# Patient Record
Sex: FEMALE | Race: OTHER | NOT HISPANIC OR LATINO
[De-identification: names, ages, dates, MRNs, and addresses within clinical notes are randomized per-mention and may not be internally consistent; named-entity substitution may affect disease eponyms.]

---

## 2018-06-18 ENCOUNTER — APPOINTMENT (OUTPATIENT)
Dept: OPHTHALMOLOGY | Facility: CLINIC | Age: 68
End: 2018-06-18
Payer: MEDICARE

## 2018-06-18 PROBLEM — Z00.00 ENCOUNTER FOR PREVENTIVE HEALTH EXAMINATION: Status: ACTIVE | Noted: 2018-06-18

## 2018-06-18 PROCEDURE — 92014 COMPRE OPH EXAM EST PT 1/>: CPT

## 2018-09-24 ENCOUNTER — APPOINTMENT (OUTPATIENT)
Dept: OPHTHALMOLOGY | Facility: CLINIC | Age: 68
End: 2018-09-24
Payer: MEDICARE

## 2018-09-24 PROCEDURE — 92012 INTRM OPH EXAM EST PATIENT: CPT

## 2018-10-11 ENCOUNTER — APPOINTMENT (OUTPATIENT)
Dept: OPHTHALMOLOGY | Facility: CLINIC | Age: 68
End: 2018-10-11
Payer: MEDICARE

## 2018-10-11 DIAGNOSIS — H25.11 AGE-RELATED NUCLEAR CATARACT, RIGHT EYE: ICD-10-CM

## 2018-10-11 DIAGNOSIS — H43.812 VITREOUS DEGENERATION, LEFT EYE: ICD-10-CM

## 2018-10-11 DIAGNOSIS — H52.31 ANISOMETROPIA: ICD-10-CM

## 2018-10-11 PROCEDURE — 92014 COMPRE OPH EXAM EST PT 1/>: CPT

## 2019-06-17 ENCOUNTER — APPOINTMENT (OUTPATIENT)
Dept: OPHTHALMOLOGY | Facility: CLINIC | Age: 69
End: 2019-06-17

## 2019-08-16 ENCOUNTER — APPOINTMENT (OUTPATIENT)
Dept: OPHTHALMOLOGY | Facility: CLINIC | Age: 69
End: 2019-08-16
Payer: MEDICARE

## 2019-08-16 ENCOUNTER — NON-APPOINTMENT (OUTPATIENT)
Age: 69
End: 2019-08-16

## 2019-08-16 PROCEDURE — 92015 DETERMINE REFRACTIVE STATE: CPT

## 2020-08-21 ENCOUNTER — APPOINTMENT (OUTPATIENT)
Dept: OPHTHALMOLOGY | Facility: CLINIC | Age: 70
End: 2020-08-21

## 2021-04-29 ENCOUNTER — APPOINTMENT (OUTPATIENT)
Dept: OTOLARYNGOLOGY | Facility: CLINIC | Age: 71
End: 2021-04-29
Payer: MEDICARE

## 2021-04-29 ENCOUNTER — NON-APPOINTMENT (OUTPATIENT)
Age: 71
End: 2021-04-29

## 2021-04-29 VITALS
OXYGEN SATURATION: 97 % | DIASTOLIC BLOOD PRESSURE: 76 MMHG | RESPIRATION RATE: 12 BRPM | HEIGHT: 59 IN | BODY MASS INDEX: 21.07 KG/M2 | SYSTOLIC BLOOD PRESSURE: 123 MMHG | HEART RATE: 78 BPM | TEMPERATURE: 97.8 F | WEIGHT: 104.5 LBS

## 2021-04-29 DIAGNOSIS — H91.90 UNSPECIFIED HEARING LOSS, UNSPECIFIED EAR: ICD-10-CM

## 2021-04-29 DIAGNOSIS — Z82.3 FAMILY HISTORY OF STROKE: ICD-10-CM

## 2021-04-29 DIAGNOSIS — Z78.9 OTHER SPECIFIED HEALTH STATUS: ICD-10-CM

## 2021-04-29 DIAGNOSIS — Z80.9 FAMILY HISTORY OF MALIGNANT NEOPLASM, UNSPECIFIED: ICD-10-CM

## 2021-04-29 DIAGNOSIS — Z82.2 FAMILY HISTORY OF DEAFNESS AND HEARING LOSS: ICD-10-CM

## 2021-04-29 DIAGNOSIS — H69.81 OTHER SPECIFIED DISORDERS OF EUSTACHIAN TUBE, RIGHT EAR: ICD-10-CM

## 2021-04-29 DIAGNOSIS — H69.80 OTHER SPECIFIED DISORDERS OF EUSTACHIAN TUBE, UNSPECIFIED EAR: ICD-10-CM

## 2021-04-29 DIAGNOSIS — Z87.11 PERSONAL HISTORY OF PEPTIC ULCER DISEASE: ICD-10-CM

## 2021-04-29 DIAGNOSIS — Z87.39 PERSONAL HISTORY OF OTHER DISEASES OF THE MUSCULOSKELETAL SYSTEM AND CONNECTIVE TISSUE: ICD-10-CM

## 2021-04-29 DIAGNOSIS — H93.8X9 OTHER SPECIFIED DISORDERS OF EAR, UNSPECIFIED EAR: ICD-10-CM

## 2021-04-29 DIAGNOSIS — Z72.89 OTHER PROBLEMS RELATED TO LIFESTYLE: ICD-10-CM

## 2021-04-29 PROCEDURE — 99203 OFFICE O/P NEW LOW 30 MIN: CPT | Mod: 25

## 2021-04-29 PROCEDURE — 92550 TYMPANOMETRY & REFLEX THRESH: CPT

## 2021-04-29 PROCEDURE — 31231 NASAL ENDOSCOPY DX: CPT

## 2021-04-29 PROCEDURE — 92557 COMPREHENSIVE HEARING TEST: CPT

## 2021-04-29 RX ORDER — VALACYCLOVIR HYDROCHLORIDE 1 G/1
TABLET, FILM COATED ORAL
Refills: 0 | Status: ACTIVE | COMMUNITY

## 2021-04-29 NOTE — HISTORY OF PRESENT ILLNESS
[de-identified] : 70F who presents with chronic R ear fullness. Patient reports she has had R ear fullness, not resolved with autoinsufflation, for many years after flying with a cold. She admits to know hearing loss for which she uses hearing aids of which the left one she lost today. She denies any hearing changes, tinnitus, otalgia, otorrhea, vertigo. No other ENT complaints. No pertinent FH/SH. She wishes to get a new hearing aid.

## 2021-04-29 NOTE — PROCEDURE
[Posterior Lesion] : posterior lesion [Anterior rhinoscopy insufficient to account for symptoms] : anterior rhinoscopy insufficient to account for symptoms [Flexible Endoscope] : examined with the flexible endoscope [Serial Number: ___] : Serial Number: [unfilled] [Normal] : the paranasal sinuses had no abnormalities [FreeTextEntry6] : Procedure explained to patient with all risks, benefits and alternatives, all questions answered. Patient positioned sitting upright. Flexible endoscope was used to examine both nasal passages. Right nasal passage with normal inferior and middle turbinates. Left nasal passage with normal inferior and middle turbinates. Nasal septum straight without deviation or perforation. OMU with open outflow tract. No masses, lesions, polyps or purulent drainage. Eustachian opening without masses or swelling. Nasopharynx without obstruction or stenosis. NO obstruction of the eustachian ostium bilaterally. performed to r/o npx lesion causing r aural fullness which does not clear. she has adenoidectomy scar in npx.\par

## 2021-04-29 NOTE — ASSESSMENT
[FreeTextEntry1] : 70F who presents with chronic R ear fullness. Audiogram shows an overly compliant R ET. Explained in detail She requested endoscopy because her last ENT used it and she was concerned about et clearing- normal, with adenoidectomy scar visible\par \par Plan:\par - HAE to replace missing hearing aid\par - f/u with mri if not already done

## 2021-05-13 ENCOUNTER — APPOINTMENT (OUTPATIENT)
Dept: PHARMACY | Facility: CLINIC | Age: 71
End: 2021-05-13
Payer: SELF-PAY

## 2021-05-13 PROCEDURE — V5010 ASSESSMENT FOR HEARING AID: CPT

## 2021-05-14 ENCOUNTER — TRANSCRIPTION ENCOUNTER (OUTPATIENT)
Age: 71
End: 2021-05-14

## 2021-05-28 ENCOUNTER — APPOINTMENT (OUTPATIENT)
Dept: PHARMACY | Facility: CLINIC | Age: 71
End: 2021-05-28
Payer: MEDICARE

## 2021-05-28 PROCEDURE — V5299A: CUSTOM | Mod: NC

## 2021-06-14 ENCOUNTER — APPOINTMENT (OUTPATIENT)
Dept: PHARMACY | Facility: CLINIC | Age: 71
End: 2021-06-14
Payer: SELF-PAY

## 2021-06-14 PROCEDURE — V5011: CPT

## 2021-06-14 PROCEDURE — V5261E: CUSTOM

## 2021-06-29 ENCOUNTER — APPOINTMENT (OUTPATIENT)
Dept: PHARMACY | Facility: CLINIC | Age: 71
End: 2021-06-29
Payer: MEDICARE

## 2021-06-29 PROCEDURE — V5299A: CUSTOM | Mod: NC

## 2022-02-25 ENCOUNTER — NON-APPOINTMENT (OUTPATIENT)
Age: 72
End: 2022-02-25

## 2022-03-02 ENCOUNTER — APPOINTMENT (OUTPATIENT)
Dept: OTOLARYNGOLOGY | Facility: CLINIC | Age: 72
End: 2022-03-02
Payer: SELF-PAY

## 2022-03-02 PROCEDURE — V5299A: CUSTOM | Mod: NC

## 2022-07-11 ENCOUNTER — APPOINTMENT (OUTPATIENT)
Dept: PHARMACY | Facility: CLINIC | Age: 72
End: 2022-07-11

## 2022-07-11 ENCOUNTER — APPOINTMENT (OUTPATIENT)
Dept: OTOLARYNGOLOGY | Facility: CLINIC | Age: 72
End: 2022-07-11

## 2022-07-11 VITALS
OXYGEN SATURATION: 97 % | WEIGHT: 104 LBS | TEMPERATURE: 97 F | HEART RATE: 88 BPM | SYSTOLIC BLOOD PRESSURE: 142 MMHG | DIASTOLIC BLOOD PRESSURE: 89 MMHG | RESPIRATION RATE: 16 BRPM | BODY MASS INDEX: 20.96 KG/M2 | HEIGHT: 59 IN

## 2022-07-11 DIAGNOSIS — H90.3 SENSORINEURAL HEARING LOSS, BILATERAL: ICD-10-CM

## 2022-07-11 DIAGNOSIS — H80.01: ICD-10-CM

## 2022-07-11 PROCEDURE — 92557 COMPREHENSIVE HEARING TEST: CPT

## 2022-07-11 PROCEDURE — 99213 OFFICE O/P EST LOW 20 MIN: CPT

## 2022-07-11 PROCEDURE — 92550 TYMPANOMETRY & REFLEX THRESH: CPT

## 2022-07-11 PROCEDURE — V5299A: CUSTOM | Mod: NC

## 2022-07-11 NOTE — HISTORY OF PRESENT ILLNESS
[de-identified] : 72 yo F with h/o b predominantly snhl wishes to check followup . Hearing has worsened over the past yr and she lost her wax guards. Denies pain or drainage. Father had undiagnosed hl at a young adult age.

## 2022-07-11 NOTE — ASSESSMENT
[FreeTextEntry1] : looks like r otosclerosis - b snhl\par discussed options- ha adjustment for now\par if worsens could consider stapes surgery\par explained diagrammatically\par brought to audiology for ha adjustment\par rtc 1 yr with kym

## 2022-07-18 ENCOUNTER — TRANSCRIPTION ENCOUNTER (OUTPATIENT)
Age: 72
End: 2022-07-18

## 2022-07-18 ENCOUNTER — NON-APPOINTMENT (OUTPATIENT)
Age: 72
End: 2022-07-18

## 2022-11-08 ENCOUNTER — NON-APPOINTMENT (OUTPATIENT)
Age: 72
End: 2022-11-08

## 2022-11-09 ENCOUNTER — APPOINTMENT (OUTPATIENT)
Dept: OTOLARYNGOLOGY | Facility: CLINIC | Age: 72
End: 2022-11-09

## 2022-11-09 VITALS
HEIGHT: 59 IN | HEART RATE: 74 BPM | OXYGEN SATURATION: 98 % | DIASTOLIC BLOOD PRESSURE: 72 MMHG | SYSTOLIC BLOOD PRESSURE: 114 MMHG | RESPIRATION RATE: 18 BRPM | BODY MASS INDEX: 21.17 KG/M2 | WEIGHT: 105 LBS | TEMPERATURE: 96.6 F

## 2022-11-09 DIAGNOSIS — H92.01 OTALGIA, RIGHT EAR: ICD-10-CM

## 2022-11-09 PROCEDURE — 99214 OFFICE O/P EST MOD 30 MIN: CPT

## 2022-11-09 NOTE — ASSESSMENT
[FreeTextEntry1] : 1. vertigo\par -\par -MRI\par -VNG\par -US Carotid Dopplers \par -meclizine discussed- pt prefers to hold off \par 2. r jaw pain likely d/t TMJ\par -Aleve BID x 10 days if pt can tolerate\par -Soft diet\par -Avoid bruxism and chewing gum\par -Followup with dentist for mouth guard - she said she has a dentist, and will call the office if they do not treat TMJ \par RTC with , MRI, VNG, US Carotid Dopplers to review findings \par

## 2022-11-09 NOTE — PHYSICAL EXAM
[Normal] : mucosa is normal [Midline] : trachea located in midline position [de-identified] : r>l [] : [place]-Hallpike test is negative [de-identified] : gait steady

## 2022-11-09 NOTE — HISTORY OF PRESENT ILLNESS
[de-identified] : 4 month followup visit for this 71 y/o F with h/o b snhl presenting with dizziness for the past 10 days. On 10/31 she woke up at night to  her puppy- leaned over and lost her balance - she had it 2 more nights - a little better now getting up slowly. Over the weekend she took a walk with her  and felt "off" also if filing with hands up felt dizzy- also exacerbated by yoga positions - she is thinking it is getting better but still persists. 89 yo  got covid 10/19 Dr Barber started her on paxlovid with him. She is c/o r jaw pain for the past two months. She saw her dentist who does not believe it is related to her teeth.

## 2022-11-11 ENCOUNTER — NON-APPOINTMENT (OUTPATIENT)
Age: 72
End: 2022-11-11

## 2022-12-22 ENCOUNTER — APPOINTMENT (OUTPATIENT)
Dept: OTOLARYNGOLOGY | Facility: CLINIC | Age: 72
End: 2022-12-22

## 2022-12-22 VITALS
BODY MASS INDEX: 21.17 KG/M2 | TEMPERATURE: 97.7 F | SYSTOLIC BLOOD PRESSURE: 146 MMHG | HEART RATE: 68 BPM | DIASTOLIC BLOOD PRESSURE: 83 MMHG | HEIGHT: 59 IN | WEIGHT: 105 LBS | OXYGEN SATURATION: 98 %

## 2022-12-22 DIAGNOSIS — I63.9 CEREBRAL INFARCTION, UNSPECIFIED: ICD-10-CM

## 2022-12-22 PROCEDURE — 92537 CALORIC VSTBLR TEST W/REC: CPT

## 2022-12-22 PROCEDURE — 99214 OFFICE O/P EST MOD 30 MIN: CPT

## 2022-12-22 PROCEDURE — 92540 BASIC VESTIBULAR EVALUATION: CPT

## 2022-12-22 PROCEDURE — 92550 TYMPANOMETRY & REFLEX THRESH: CPT | Mod: 52

## 2022-12-22 PROCEDURE — 92557 COMPREHENSIVE HEARING TEST: CPT

## 2022-12-22 NOTE — DATA REVIEWED
[de-identified] : VNG showed l unilateral vestibular weakness -results reviewed with pt \par  showed r mixed hl, l snhl stable when compared with 22 -results reviewed with pt  [de-identified] : MRI revealed r cerebellar infarct -results reviewed with pt  [de-identified] : US Carotids showed plaques in carotid arteries, no significant stenosis -results reviewed with pt

## 2022-12-22 NOTE — HISTORY OF PRESENT ILLNESS
[de-identified] : 6 week followup visit for this 71 y/o F with dizziness. She is here to review MRI, , VNG, and US Carotid Dopplers. She feels dizziness has improved, but not resolved completely. She had MRI which revealed right cerebellar stroke and saw Dr. Nielsen for this. He is treating her for this and she brought his report.

## 2023-03-09 ENCOUNTER — APPOINTMENT (OUTPATIENT)
Dept: OTOLARYNGOLOGY | Facility: CLINIC | Age: 73
End: 2023-03-09
Payer: MEDICARE

## 2023-03-09 VITALS
WEIGHT: 103 LBS | DIASTOLIC BLOOD PRESSURE: 76 MMHG | HEART RATE: 68 BPM | BODY MASS INDEX: 20.76 KG/M2 | OXYGEN SATURATION: 97 % | SYSTOLIC BLOOD PRESSURE: 115 MMHG | TEMPERATURE: 97.5 F | HEIGHT: 59 IN

## 2023-03-09 DIAGNOSIS — H69.80 OTHER SPECIFIED DISORDERS OF EUSTACHIAN TUBE, UNSPECIFIED EAR: ICD-10-CM

## 2023-03-09 DIAGNOSIS — H93.8X1 OTHER SPECIFIED DISORDERS OF RIGHT EAR: ICD-10-CM

## 2023-03-09 PROCEDURE — 31231 NASAL ENDOSCOPY DX: CPT

## 2023-03-09 PROCEDURE — 99213 OFFICE O/P EST LOW 20 MIN: CPT | Mod: 25

## 2023-03-09 NOTE — PHYSICAL EXAM
[Midline] : trachea located in midline position [Nasal Endoscopy Performed] : nasal endoscopy was performed, see procedure section for findings [Normal] : no neck adenopathy [de-identified] : r>l TMJ  [de-identified] : gait steady

## 2023-03-09 NOTE — REASON FOR VISIT
[Subsequent Evaluation] : a subsequent evaluation for [FreeTextEntry2] : dizziness, r aural fullness

## 2023-03-09 NOTE — PROCEDURE
[Anterior rhinoscopy insufficient to account for symptoms] : anterior rhinoscopy insufficient to account for symptoms [None] : none [Flexible Endoscope] : examined with the flexible endoscope [Normal] : the paranasal sinuses had no abnormalities [Serial Number: ___] : Serial Number: [unfilled] [FreeTextEntry6] : done for r aural fullness to r/o npx mass, check omu patency \par appears nl

## 2023-03-09 NOTE — HISTORY OF PRESENT ILLNESS
[de-identified] : 3 month followup visit for this 71 y/o F with dizziness. She had MRI which revealed r cerebellar stroke and saw Dr. Nielsen for this and is being treated with 81 mg aspirin and a statin. She has been going to vestibular therapy and dizziness has resolved. She has had no further episodes of dizziness. She has r mixed hl and l snhl and wears hearing aids. She denies changes in hearing. She feels her r ear is chronically congested since a plane flight in 2015. She can insufflate her ears. She denies h/o allergies and does not use any nasal sprays.

## 2023-03-09 NOTE — ASSESSMENT
[FreeTextEntry1] : 1. vertigo\par -resolved with vestibular therapy\par -advised to continue to followup with Dr. Nielsen for cva\par 2. r pressure sensation likely d/t TMJ\par -scope appeared nl\par -aleve BID x 10 days if pt can tolerate\par -soft diet\par -avoid bruxism and chewing gum\par -followup with dentist for mouth guard- she has one\par -CT scan to r/o otosclerosis, understand ear anatomy\par RTC with CT to review findings

## 2023-03-23 ENCOUNTER — NON-APPOINTMENT (OUTPATIENT)
Age: 73
End: 2023-03-23

## 2023-03-24 ENCOUNTER — NON-APPOINTMENT (OUTPATIENT)
Age: 73
End: 2023-03-24

## 2023-03-26 ENCOUNTER — NON-APPOINTMENT (OUTPATIENT)
Age: 73
End: 2023-03-26

## 2023-03-29 PROBLEM — G93.89 BRAIN MASS: Status: ACTIVE | Noted: 2023-03-29

## 2023-04-03 ENCOUNTER — APPOINTMENT (OUTPATIENT)
Dept: OTOLARYNGOLOGY | Facility: CLINIC | Age: 73
End: 2023-04-03

## 2023-04-03 ENCOUNTER — APPOINTMENT (OUTPATIENT)
Dept: NEUROSURGERY | Facility: CLINIC | Age: 73
End: 2023-04-03

## 2023-04-03 DIAGNOSIS — G93.89 OTHER SPECIFIED DISORDERS OF BRAIN: ICD-10-CM

## 2023-04-14 ENCOUNTER — APPOINTMENT (OUTPATIENT)
Dept: NEUROSURGERY | Facility: CLINIC | Age: 73
End: 2023-04-14
Payer: MEDICARE

## 2023-04-14 VITALS
HEART RATE: 70 BPM | RESPIRATION RATE: 17 BRPM | BODY MASS INDEX: 20.76 KG/M2 | DIASTOLIC BLOOD PRESSURE: 75 MMHG | WEIGHT: 103 LBS | HEIGHT: 59 IN | TEMPERATURE: 98.3 F | SYSTOLIC BLOOD PRESSURE: 115 MMHG | OXYGEN SATURATION: 98 %

## 2023-04-14 PROCEDURE — 99204 OFFICE O/P NEW MOD 45 MIN: CPT

## 2023-04-14 NOTE — HISTORY OF PRESENT ILLNESS
[de-identified] : 72 year old female presents referred from Dr. Nielsen neurology) with newly diagnosed right temporal meningioma. \par \par She underwent MRI IAC in November and there was no mention of this lesion.

## 2023-04-14 NOTE — DATA REVIEWED
[de-identified] : I have reviewed the most recent MRI  4/3/23 at [facility] which [id number] X 5mm X5mm right temporal convexity meningioma, stable when compared to 11/10/22

## 2023-04-14 NOTE — ASSESSMENT
[FreeTextEntry1] : My impression is that the patient suffers from a stable rght temporal meningioma  .  I had a long discussion with the patient regarding the role of continued observation with serial imaging.  The patient was extensively educated about the nature of her disease process. Therapeutic and diagnostic tests include MRI brain with and without contrast in 1 year.  The patient should see me back in March 2024. I have explained the alternatives, risks and benefits to the patient and she understands and agrees to proceed.  This risk of the procedure including but not limited to pain, infection, seizure, stroke, recurrence, residual disease, neurovascular injury, heart attack, pulmonary embolism, blindness, weakness, paralysis and death have been carefully explained to the patient who clearly understands and agrees to proceed.\par

## 2023-07-14 ENCOUNTER — APPOINTMENT (OUTPATIENT)
Dept: OTOLARYNGOLOGY | Facility: CLINIC | Age: 73
End: 2023-07-14
Payer: MEDICARE

## 2023-07-14 ENCOUNTER — APPOINTMENT (OUTPATIENT)
Dept: PHARMACY | Facility: CLINIC | Age: 73
End: 2023-07-14
Payer: SELF-PAY

## 2023-07-14 VITALS
SYSTOLIC BLOOD PRESSURE: 122 MMHG | TEMPERATURE: 98 F | WEIGHT: 102 LBS | OXYGEN SATURATION: 98 % | BODY MASS INDEX: 20.56 KG/M2 | HEIGHT: 59 IN | DIASTOLIC BLOOD PRESSURE: 74 MMHG | HEART RATE: 78 BPM

## 2023-07-14 DIAGNOSIS — R42 DIZZINESS AND GIDDINESS: ICD-10-CM

## 2023-07-14 DIAGNOSIS — D32.9 BENIGN NEOPLASM OF MENINGES, UNSPECIFIED: ICD-10-CM

## 2023-07-14 DIAGNOSIS — R68.84 JAW PAIN: ICD-10-CM

## 2023-07-14 PROCEDURE — 92550 TYMPANOMETRY & REFLEX THRESH: CPT | Mod: 52

## 2023-07-14 PROCEDURE — 92557 COMPREHENSIVE HEARING TEST: CPT

## 2023-07-14 PROCEDURE — V5299A: CUSTOM

## 2023-07-14 PROCEDURE — 99213 OFFICE O/P EST LOW 20 MIN: CPT

## 2023-07-14 NOTE — REASON FOR VISIT
[Subsequent Evaluation] : a subsequent evaluation for [FreeTextEntry2] : hearing loss, r aural fullness

## 2023-07-14 NOTE — DATA REVIEWED
[de-identified] :  showed r mixed hearing loss, l hf snhl- stable compared with 22 -results reviewed with pt

## 2023-07-14 NOTE — ASSESSMENT
[FreeTextEntry1] : 1. vertigo\par -resolved with vestibular therapy \par 2. r mixed hearing loss, l hf snhl\par - showed r mixed hearing loss, l hf snhl- stable compared with 22 -results reviewed with pt \par -recommended she continue with hearing aids\par RTC in 1 yr with rpt ; call sooner for any issues

## 2023-07-14 NOTE — HISTORY OF PRESENT ILLNESS
[de-identified] : 4.5 month followup visit for this 73 y/o F with h/o r mixed hearing loss and l snhl. She wears b hearing aids and is concerned her hearing has gradually worsened since last visit. She is c/o r crackling sensation and crepitus in r TMJ region. She has seen Dr. Holguin for r temporal meningioma. She has been dizzy in the past but reports this has resolved with vestibular therapy.

## 2023-12-18 ENCOUNTER — APPOINTMENT (OUTPATIENT)
Dept: OTOLARYNGOLOGY | Facility: CLINIC | Age: 73
End: 2023-12-18
Payer: SELF-PAY

## 2023-12-18 PROCEDURE — V5299A: CUSTOM

## 2024-03-18 ENCOUNTER — NON-APPOINTMENT (OUTPATIENT)
Age: 74
End: 2024-03-18

## 2024-03-19 ENCOUNTER — APPOINTMENT (OUTPATIENT)
Dept: PHARMACY | Facility: CLINIC | Age: 74
End: 2024-03-19
Payer: SELF-PAY

## 2024-03-19 PROCEDURE — V5299A: CUSTOM

## 2024-04-02 ENCOUNTER — APPOINTMENT (OUTPATIENT)
Dept: MRI IMAGING | Facility: HOSPITAL | Age: 74
End: 2024-04-02

## 2024-04-02 ENCOUNTER — OUTPATIENT (OUTPATIENT)
Dept: OUTPATIENT SERVICES | Facility: HOSPITAL | Age: 74
LOS: 1 days | End: 2024-04-02
Payer: MEDICARE

## 2024-04-02 PROCEDURE — 70553 MRI BRAIN STEM W/O & W/DYE: CPT | Mod: 26,MH

## 2024-04-02 PROCEDURE — A9585: CPT

## 2024-04-02 PROCEDURE — 70553 MRI BRAIN STEM W/O & W/DYE: CPT

## 2024-04-05 ENCOUNTER — APPOINTMENT (OUTPATIENT)
Dept: NEUROSURGERY | Facility: CLINIC | Age: 74
End: 2024-04-05
Payer: MEDICARE

## 2024-04-05 VITALS
SYSTOLIC BLOOD PRESSURE: 117 MMHG | OXYGEN SATURATION: 98 % | HEIGHT: 59 IN | RESPIRATION RATE: 18 BRPM | DIASTOLIC BLOOD PRESSURE: 70 MMHG | BODY MASS INDEX: 20.56 KG/M2 | TEMPERATURE: 97.7 F | WEIGHT: 102 LBS | HEART RATE: 79 BPM

## 2024-04-05 DIAGNOSIS — Z87.891 PERSONAL HISTORY OF NICOTINE DEPENDENCE: ICD-10-CM

## 2024-04-05 PROCEDURE — 99204 OFFICE O/P NEW MOD 45 MIN: CPT

## 2024-04-05 PROCEDURE — 99214 OFFICE O/P EST MOD 30 MIN: CPT

## 2024-04-05 NOTE — HISTORY OF PRESENT ILLNESS
[FreeTextEntry1] : 72 year old female initially presented to neurology and ENT MD Valdes for dizziness. Dr. Nielsen (neurology) referred the patient with right temporal meningioma diagnosed 4/3/2023 at [facility]. She had MRI which revealed r cerebellar stroke and saw Dr. Nielsen for this and is being treated with 81 mg aspirin and a statin.   Of Note: She underwent MRI IAC in November 10, 2022 and there was no mention of this lesion.   As of 3/9/23 ENT Lucio evaluated aptient for dizziness- which was reported improved after vestibular therapy. Patient is being treated for R mixed HL & L SNHL, wears hearing aids.   4/14/23 Seen in our office for new patient visit for right temporal meningioma. Stable at that visit.  TODAY 4/5/24-The patient presents to review her MRI.

## 2024-04-05 NOTE — RESULTS/DATA
[FreeTextEntry1] : ACC: [id number]     EXAM:  MR BRAIN WAW IC   ORDERED BY: ARIELLE IBARRA  PROCEDURE DATE:  04/02/2024    INTERPRETATION:  CLINICAL STATEMENT: Meningioma.  TECHNIQUE: Multiplanar multisequence MRI of the brain was performed, prior to and following the uncomplicated intravenous administration of 7.5 cc of gadolinium-based contrast (Gadavist). 0 cc discarded. Diffusion weighted imaging provided. COMPARISON: Outside prior MRI brain 4/3/23.  FINDINGS:  No restricted diffusion is present.  Corpus callosum, pituitary and pineal regions appear unremarkable. No tonsillar herniation or evidence of marrow replacement process. Hyperostosis frontalis.  CP angle and IAC regions appear within normal limits, as do the globes, intraconal regions and major intracranial flow-voids. No paranasal sinus air-fluid levels or opacification is evident. Paranasal sinus mucosal thickening with partial opacification bilateral anterior ethmoid air cells. Leftward deviation nasal septum. Partial opacification right greater than left mastoid air cells.  Again seen is mild, age-appropriate atrophy with mild confluent T2-FLAIR prolongation along the margins of the lateral ventricles and additional stable focus of T2-FLAIR prolongation right medial cerebellar hemisphere. No new enhancing intra-axial mass or abnormal leptomeningeal enhancement.  Stable (given differences in technique) approximate 0.7 (AP) x 0.4 (TV) x 0.4 (cc) cm enhancing, dural based extra-axial mass right posterior temporal region, demonstrating mild subjacent cortical mass effect without vasogenic edema. No new enhancing extra-axial mass.  IMPRESSION: 1. No significant change, given differences in technique, compared with 4/3/23. 2. Findings most concordant with an approximate 0.7 cm meningioma RIGHT posterior temporal region, demonstrating subjacent mass effect without vasogenic edema. No new enhancing mass or abnormal leptomeningeal enhancement. Continued surveillance recommended. 3. Nonspecific periventricular and right medial cerebellar altered signal, statistically chronic microvascular ischemic change. 4. Additional findings described in detail above.  --- End of Report ---      AJ CARTER M.D., ATTENDING RADIOLOGIST This document has been electronically signed. Apr 2 2024  2:48PM

## 2024-04-05 NOTE — ASSESSMENT
[FreeTextEntry1] : The patients established problem of right temporal meningioma is stable. I had a long discussion with the patient regarding the role of continued surveillance.  The patient was extensively educated about the nature of her disease process. Therapeutic and diagnostic tests include MRI Head w/wo IVC.  The patient should continue to see ENT Storper for her hearing loss.  I will see the patient back in 18 month October 2025 with a new MRI. I have explained the alternatives, risks and benefits to the patient and she understands and agrees to proceed.

## 2024-04-05 NOTE — DATA REVIEWED
[de-identified] : I have reviewed the most recent MRI on 4/2/24 at"[facility] which shows a stable enhancing 0.7 cm meningioma RIGHT posterior temporal region, demonstrating subjacent mass effect without vasogenic edema.

## 2024-05-23 ENCOUNTER — APPOINTMENT (OUTPATIENT)
Dept: OTOLARYNGOLOGY | Facility: CLINIC | Age: 74
End: 2024-05-23

## 2024-06-10 ENCOUNTER — APPOINTMENT (OUTPATIENT)
Dept: OTOLARYNGOLOGY | Facility: CLINIC | Age: 74
End: 2024-06-10
Payer: MEDICARE

## 2024-06-10 ENCOUNTER — NON-APPOINTMENT (OUTPATIENT)
Age: 74
End: 2024-06-10

## 2024-06-10 VITALS
BODY MASS INDEX: 20.56 KG/M2 | WEIGHT: 102 LBS | SYSTOLIC BLOOD PRESSURE: 107 MMHG | HEART RATE: 76 BPM | HEIGHT: 59 IN | DIASTOLIC BLOOD PRESSURE: 72 MMHG

## 2024-06-10 DIAGNOSIS — H90.A31 MIXED CONDUCTIVE AND SENSORINEURAL HEARING LOSS, UNILATERAL, RIGHT EAR WITH RESTRICTED HEARING ON THE  CONTRALATERAL SIDE: ICD-10-CM

## 2024-06-10 DIAGNOSIS — J30.1 ALLERGIC RHINITIS DUE TO POLLEN: ICD-10-CM

## 2024-06-10 PROCEDURE — 99213 OFFICE O/P EST LOW 20 MIN: CPT | Mod: 25

## 2024-06-10 PROCEDURE — 31231 NASAL ENDOSCOPY DX: CPT

## 2024-06-10 PROCEDURE — 92567 TYMPANOMETRY: CPT

## 2024-06-10 PROCEDURE — 92557 COMPREHENSIVE HEARING TEST: CPT

## 2024-06-10 RX ORDER — FLUTICASONE PROPIONATE 50 UG/1
50 SPRAY, METERED NASAL DAILY
Qty: 1 | Refills: 2 | Status: ACTIVE | COMMUNITY
Start: 2024-06-10 | End: 1900-01-01

## 2024-06-10 NOTE — PROCEDURE
[Posterior Lesion] : posterior lesion [Anterior rhinoscopy insufficient to account for symptoms] : anterior rhinoscopy insufficient to account for symptoms [Topical Lidocaine] : topical lidocaine [Oxymetazoline HCl] : oxymetazoline HCl [Flexible Endoscope] : examined with the flexible endoscope [Serial Number: ___] : Serial Number: [unfilled] [Congested] : congested [Normal] : the paranasal sinuses had no abnormalities [FreeTextEntry6] : done for nasal obstruction clear Mucosa: b nl  Mucus:b nl Polyps:b nl Inferior turbinate:b nl Middle turbinate:b nl Superior turbinate:b nl Inferior Meatus:b nl Middle Meatus:b nl Superior meatus:b nl Sphenoethmoidal recess:b nl

## 2024-06-10 NOTE — ASSESSMENT
[FreeTextEntry1] : reassured thresholds decreased only minimally as rec use ha flonase, avoidance  for AR rtc 6 mo and as needed

## 2024-06-10 NOTE — HISTORY OF PRESENT ILLNESS
[de-identified] : 11 month follow up visit for this 72 y/o F with h/o r mixed hearing loss and l snhl. She wears b hearing aids. She called the office earlier today with concern for decrease in hearing over the past month and was advised to come in. She has history of r temporal meningioma and sees Dr. Holguin. She is experiencing significant stress.She also related b nasal obstruction.

## 2024-06-17 ENCOUNTER — APPOINTMENT (OUTPATIENT)
Dept: PHARMACY | Facility: CLINIC | Age: 74
End: 2024-06-17
Payer: SELF-PAY

## 2024-06-17 PROCEDURE — V5299A: CUSTOM

## 2024-07-01 ENCOUNTER — APPOINTMENT (OUTPATIENT)
Dept: PHARMACY | Facility: CLINIC | Age: 74
End: 2024-07-01

## 2024-07-01 ENCOUNTER — APPOINTMENT (OUTPATIENT)
Dept: OTOLARYNGOLOGY | Facility: CLINIC | Age: 74
End: 2024-07-01

## 2024-08-08 ENCOUNTER — NON-APPOINTMENT (OUTPATIENT)
Age: 74
End: 2024-08-08

## 2024-08-08 ENCOUNTER — RX RENEWAL (OUTPATIENT)
Age: 74
End: 2024-08-08

## 2024-09-30 ENCOUNTER — APPOINTMENT (OUTPATIENT)
Dept: OTOLARYNGOLOGY | Facility: CLINIC | Age: 74
End: 2024-09-30
Payer: SELF-PAY

## 2024-09-30 PROCEDURE — V5014D: CUSTOM

## 2025-01-02 ENCOUNTER — APPOINTMENT (OUTPATIENT)
Dept: PHARMACY | Facility: CLINIC | Age: 75
End: 2025-01-02
Payer: SELF-PAY

## 2025-01-02 PROCEDURE — V5299J: CUSTOM

## 2025-06-03 ENCOUNTER — APPOINTMENT (OUTPATIENT)
Dept: PHARMACY | Facility: CLINIC | Age: 75
End: 2025-06-03
Payer: SELF-PAY

## 2025-06-03 PROCEDURE — V5299A: CUSTOM | Mod: NC

## 2025-06-10 ENCOUNTER — APPOINTMENT (OUTPATIENT)
Dept: OTOLARYNGOLOGY | Facility: CLINIC | Age: 75
End: 2025-06-10
Payer: MEDICARE

## 2025-06-10 VITALS
HEIGHT: 59 IN | SYSTOLIC BLOOD PRESSURE: 122 MMHG | WEIGHT: 102 LBS | BODY MASS INDEX: 20.56 KG/M2 | OXYGEN SATURATION: 98 % | DIASTOLIC BLOOD PRESSURE: 75 MMHG | TEMPERATURE: 98 F | HEART RATE: 82 BPM

## 2025-06-10 PROBLEM — G45.9 MINI STROKE: Status: RESOLVED | Noted: 2025-06-10 | Resolved: 2025-06-10

## 2025-06-10 PROBLEM — Z86.73 HISTORY OF CEREBROVASCULAR ACCIDENT: Status: RESOLVED | Noted: 2025-06-10 | Resolved: 2025-06-10

## 2025-06-10 PROBLEM — H61.23 BILATERAL IMPACTED CERUMEN: Status: ACTIVE | Noted: 2025-06-10

## 2025-06-10 PROCEDURE — 31231 NASAL ENDOSCOPY DX: CPT

## 2025-06-10 PROCEDURE — 92557 COMPREHENSIVE HEARING TEST: CPT

## 2025-06-10 PROCEDURE — 92567 TYMPANOMETRY: CPT

## 2025-06-10 PROCEDURE — 99213 OFFICE O/P EST LOW 20 MIN: CPT | Mod: 25

## 2025-06-10 PROCEDURE — G0268 REMOVAL OF IMPACTED WAX MD: CPT

## 2025-06-10 RX ORDER — BUPROPION HYDROCHLORIDE 100 MG/1
TABLET, FILM COATED ORAL
Refills: 0 | Status: ACTIVE | COMMUNITY

## 2025-06-10 RX ORDER — ASPIRIN 81 MG
TABLET,CHEWABLE ORAL
Refills: 0 | Status: ACTIVE | COMMUNITY

## 2025-06-10 RX ORDER — ROSUVASTATIN CALCIUM 10 MG/1
10 TABLET, FILM COATED ORAL
Refills: 0 | Status: ACTIVE | COMMUNITY

## 2025-07-08 ENCOUNTER — APPOINTMENT (OUTPATIENT)
Dept: OTOLARYNGOLOGY | Facility: CLINIC | Age: 75
End: 2025-07-08
Payer: MEDICARE

## 2025-07-08 ENCOUNTER — APPOINTMENT (OUTPATIENT)
Dept: PHARMACY | Facility: CLINIC | Age: 75
End: 2025-07-08
Payer: MEDICARE

## 2025-07-08 VITALS
BODY MASS INDEX: 20.56 KG/M2 | SYSTOLIC BLOOD PRESSURE: 126 MMHG | OXYGEN SATURATION: 98 % | DIASTOLIC BLOOD PRESSURE: 68 MMHG | TEMPERATURE: 98 F | HEIGHT: 59 IN | HEART RATE: 82 BPM | WEIGHT: 102 LBS

## 2025-07-08 PROCEDURE — 99213 OFFICE O/P EST LOW 20 MIN: CPT

## 2025-07-08 PROCEDURE — V5299J: CUSTOM

## 2025-07-09 PROBLEM — R43.0 ANOSMIA: Status: ACTIVE | Noted: 2025-06-10

## 2025-09-10 ENCOUNTER — APPOINTMENT (OUTPATIENT)
Dept: MRI IMAGING | Facility: CLINIC | Age: 75
End: 2025-09-10
Payer: MEDICARE

## 2025-09-10 PROCEDURE — 70553 MRI BRAIN STEM W/O & W/DYE: CPT

## 2025-09-10 PROCEDURE — A9585: CPT | Mod: JW
